# Patient Record
Sex: MALE | Race: WHITE | NOT HISPANIC OR LATINO | Employment: FULL TIME | URBAN - NONMETROPOLITAN AREA
[De-identification: names, ages, dates, MRNs, and addresses within clinical notes are randomized per-mention and may not be internally consistent; named-entity substitution may affect disease eponyms.]

---

## 2019-05-31 ENCOUNTER — HOSPITAL ENCOUNTER (EMERGENCY)
Facility: HOSPITAL | Age: 29
End: 2019-05-31

## 2020-09-21 ENCOUNTER — HOSPITAL ENCOUNTER (EMERGENCY)
Facility: HOSPITAL | Age: 30
Discharge: HOME | End: 2020-09-21
Attending: EMERGENCY MEDICINE | Admitting: EMERGENCY MEDICINE

## 2020-09-21 ENCOUNTER — TELEPHONE (OUTPATIENT)
Dept: EMERGENCY DEPT | Facility: HOSPITAL | Age: 30
End: 2020-09-21

## 2020-09-21 VITALS
HEIGHT: 71 IN | TEMPERATURE: 99.1 F | BODY MASS INDEX: 32.2 KG/M2 | SYSTOLIC BLOOD PRESSURE: 137 MMHG | OXYGEN SATURATION: 98 % | RESPIRATION RATE: 18 BRPM | DIASTOLIC BLOOD PRESSURE: 93 MMHG | HEART RATE: 109 BPM | WEIGHT: 230 LBS

## 2020-09-21 DIAGNOSIS — R11.2 NON-INTRACTABLE VOMITING WITH NAUSEA, UNSPECIFIED VOMITING TYPE: Primary | ICD-10-CM

## 2020-09-21 DIAGNOSIS — R50.9 FEVER, UNSPECIFIED FEVER CAUSE: ICD-10-CM

## 2020-09-21 DIAGNOSIS — R51.9 ACUTE NONINTRACTABLE HEADACHE, UNSPECIFIED HEADACHE TYPE: ICD-10-CM

## 2020-09-21 LAB
ALBUMIN SERPL-MCNC: 4.3 G/DL (ref 3.5–5.2)
ALBUMIN/GLOB SERPL: 1.3 G/DL
ALP SERPL-CCNC: 81 U/L (ref 39–117)
ALT SERPL W P-5'-P-CCNC: 31 U/L (ref 1–41)
ANION GAP SERPL CALCULATED.3IONS-SCNC: 10 MMOL/L (ref 5–15)
AST SERPL-CCNC: 26 U/L (ref 1–40)
BASOPHILS # BLD AUTO: 0.01 10*3/MM3 (ref 0–0.2)
BASOPHILS NFR BLD AUTO: 0.3 % (ref 0–1.5)
BILIRUB SERPL-MCNC: 0.5 MG/DL (ref 0–1.2)
BUN SERPL-MCNC: 11 MG/DL (ref 6–20)
BUN/CREAT SERPL: 9.7 (ref 7–25)
CALCIUM SPEC-SCNC: 8.7 MG/DL (ref 8.6–10.5)
CHLORIDE SERPL-SCNC: 99 MMOL/L (ref 98–107)
CO2 SERPL-SCNC: 26 MMOL/L (ref 22–29)
CREAT SERPL-MCNC: 1.13 MG/DL (ref 0.76–1.27)
DEPRECATED RDW RBC AUTO: 36.3 FL (ref 37–54)
EOSINOPHIL # BLD AUTO: 0 10*3/MM3 (ref 0–0.4)
EOSINOPHIL NFR BLD AUTO: 0 % (ref 0.3–6.2)
ERYTHROCYTE [DISTWIDTH] IN BLOOD BY AUTOMATED COUNT: 12 % (ref 12.3–15.4)
GFR SERPL CREATININE-BSD FRML MDRD: 75 ML/MIN/1.73
GLOBULIN UR ELPH-MCNC: 3.2 GM/DL
GLUCOSE SERPL-MCNC: 123 MG/DL (ref 65–99)
HCT VFR BLD AUTO: 45.1 % (ref 37.5–51)
HGB BLD-MCNC: 14.9 G/DL (ref 13–17.7)
IMM GRANULOCYTES # BLD AUTO: 0.02 10*3/MM3 (ref 0–0.05)
IMM GRANULOCYTES NFR BLD AUTO: 0.5 % (ref 0–0.5)
LIPASE SERPL-CCNC: 17 U/L (ref 13–60)
LYMPHOCYTES # BLD AUTO: 0.48 10*3/MM3 (ref 0.7–3.1)
LYMPHOCYTES NFR BLD AUTO: 12.5 % (ref 19.6–45.3)
MCH RBC QN AUTO: 28 PG (ref 26.6–33)
MCHC RBC AUTO-ENTMCNC: 33 G/DL (ref 31.5–35.7)
MCV RBC AUTO: 84.8 FL (ref 79–97)
MONOCYTES # BLD AUTO: 0.24 10*3/MM3 (ref 0.1–0.9)
MONOCYTES NFR BLD AUTO: 6.3 % (ref 5–12)
NEUTROPHILS NFR BLD AUTO: 3.09 10*3/MM3 (ref 1.7–7)
NEUTROPHILS NFR BLD AUTO: 80.4 % (ref 42.7–76)
NRBC BLD AUTO-RTO: 0 /100 WBC (ref 0–0.2)
PLATELET # BLD AUTO: 172 10*3/MM3 (ref 140–450)
PMV BLD AUTO: 8.2 FL (ref 6–12)
POTASSIUM SERPL-SCNC: 4.2 MMOL/L (ref 3.5–5.2)
PROT SERPL-MCNC: 7.5 G/DL (ref 6–8.5)
RBC # BLD AUTO: 5.32 10*6/MM3 (ref 4.14–5.8)
SARS-COV-2 RNA NOSE QL NAA+PROBE: NOT DETECTED
SODIUM SERPL-SCNC: 135 MMOL/L (ref 136–145)
WBC # BLD AUTO: 3.84 10*3/MM3 (ref 3.4–10.8)

## 2020-09-21 PROCEDURE — 85025 COMPLETE CBC W/AUTO DIFF WBC: CPT | Performed by: EMERGENCY MEDICINE

## 2020-09-21 PROCEDURE — 96375 TX/PRO/DX INJ NEW DRUG ADDON: CPT

## 2020-09-21 PROCEDURE — 80053 COMPREHEN METABOLIC PANEL: CPT | Performed by: EMERGENCY MEDICINE

## 2020-09-21 PROCEDURE — U0004 COV-19 TEST NON-CDC HGH THRU: HCPCS | Performed by: EMERGENCY MEDICINE

## 2020-09-21 PROCEDURE — 25010000002 METOCLOPRAMIDE PER 10 MG: Performed by: EMERGENCY MEDICINE

## 2020-09-21 PROCEDURE — 96374 THER/PROPH/DIAG INJ IV PUSH: CPT

## 2020-09-21 PROCEDURE — 99283 EMERGENCY DEPT VISIT LOW MDM: CPT

## 2020-09-21 PROCEDURE — 83690 ASSAY OF LIPASE: CPT | Performed by: EMERGENCY MEDICINE

## 2020-09-21 PROCEDURE — 25010000002 KETOROLAC TROMETHAMINE PER 15 MG: Performed by: EMERGENCY MEDICINE

## 2020-09-21 PROCEDURE — 25010000002 ONDANSETRON PER 1 MG: Performed by: EMERGENCY MEDICINE

## 2020-09-21 PROCEDURE — 25010000002 DIPHENHYDRAMINE PER 50 MG: Performed by: EMERGENCY MEDICINE

## 2020-09-21 RX ORDER — DIPHENHYDRAMINE HYDROCHLORIDE 50 MG/ML
50 INJECTION INTRAMUSCULAR; INTRAVENOUS ONCE
Status: COMPLETED | OUTPATIENT
Start: 2020-09-21 | End: 2020-09-21

## 2020-09-21 RX ORDER — SODIUM CHLORIDE 0.9 % (FLUSH) 0.9 %
10 SYRINGE (ML) INJECTION AS NEEDED
Status: DISCONTINUED | OUTPATIENT
Start: 2020-09-21 | End: 2020-09-21 | Stop reason: HOSPADM

## 2020-09-21 RX ORDER — NAPROXEN 500 MG/1
500 TABLET ORAL 2 TIMES DAILY WITH MEALS
Qty: 20 TABLET | Refills: 0 | OUTPATIENT
Start: 2020-09-21 | End: 2022-04-14

## 2020-09-21 RX ORDER — ONDANSETRON 4 MG/1
4 TABLET, ORALLY DISINTEGRATING ORAL 4 TIMES DAILY PRN
Qty: 15 TABLET | Refills: 0 | OUTPATIENT
Start: 2020-09-21 | End: 2022-04-14

## 2020-09-21 RX ORDER — ONDANSETRON 2 MG/ML
4 INJECTION INTRAMUSCULAR; INTRAVENOUS
Status: DISCONTINUED | OUTPATIENT
Start: 2020-09-21 | End: 2020-09-21 | Stop reason: HOSPADM

## 2020-09-21 RX ORDER — METOCLOPRAMIDE HYDROCHLORIDE 5 MG/ML
10 INJECTION INTRAMUSCULAR; INTRAVENOUS ONCE
Status: COMPLETED | OUTPATIENT
Start: 2020-09-21 | End: 2020-09-21

## 2020-09-21 RX ORDER — KETOROLAC TROMETHAMINE 30 MG/ML
30 INJECTION, SOLUTION INTRAMUSCULAR; INTRAVENOUS ONCE
Status: COMPLETED | OUTPATIENT
Start: 2020-09-21 | End: 2020-09-21

## 2020-09-21 RX ADMIN — DIPHENHYDRAMINE HYDROCHLORIDE 50 MG: 50 INJECTION INTRAMUSCULAR; INTRAVENOUS at 04:51

## 2020-09-21 RX ADMIN — ONDANSETRON 4 MG: 2 INJECTION INTRAMUSCULAR; INTRAVENOUS at 04:48

## 2020-09-21 RX ADMIN — KETOROLAC TROMETHAMINE 30 MG: 30 INJECTION, SOLUTION INTRAMUSCULAR at 04:52

## 2020-09-21 RX ADMIN — METOCLOPRAMIDE 10 MG: 5 INJECTION, SOLUTION INTRAMUSCULAR; INTRAVENOUS at 04:54

## 2020-09-21 RX ADMIN — SODIUM CHLORIDE 1000 ML: 9 INJECTION, SOLUTION INTRAVENOUS at 04:45

## 2020-09-21 NOTE — ED PROVIDER NOTES
EMERGENCY DEPARTMENT ENCOUNTER      Pt Name: Clifton Briggs  MRN: [id number]  YOB: 1990  Date of evaluation: 9/21/2020  Provider: Carlos Mccarthy DO    CHIEF COMPLAINT       Chief Complaint   Patient presents with   • Vomiting         HISTORY OF PRESENT ILLNESS  (Location/Symptom, Timing/Onset, Context/Setting, Quality, Duration, Modifying Factors, Severity.)   Clifton Briggs is a 30 y.o. male who presents to the emergency department for evaluation of nausea vomiting fever, onset earlier this morning upon awakening.  He notes low-grade fever thought the day, then nausea vomiting, nonbloody, nonbilious in nature throughout the evening.  The patient denies any recent known sick exposures or recent travel.  No prior coronavirus testing or known sick contacts.  He does work at [employer].  The patient also notes a generalized headache, migrainous in nature with a history of recurrent migraines.  Denies any diarrhea, has had some very mild constipation.  Denies any chest pain difficulty breathing, no abdominal pain.  He does note photophobia and phonophobia with a history of recurrent headaches and migraines.  No neck pain or stiffness.  He denies any other acute systemic complaints at this time.      Nursing notes were reviewed.    REVIEW OF SYSTEMS    (2-9 systems for level 4, 10 or more for level 5)   ROS:  General:  + fevers, no chills, no weakness  Cardiovascular:  No chest pain, no palpitations  Respiratory:  No shortness of breath, no cough, no wheezing  Gastrointestinal:  No pain, positive nausea, vomiting, no diarrhea  Musculoskeletal:  No muscle pain, no joint pain  Skin:  No rash, no easy bruising  Neurologic:  No speech problems, + headache, no extremity numbness, no extremity tingling, no extremity weakness  Psychiatric:  No anxiety  Genitourinary:  No dysuria, no hematuria    Except as noted above the remainder of the review of systems was reviewed and negative.       PAST  "MEDICAL HISTORY     Past Medical History:   Diagnosis Date   • Migraine    • Nosebleed          SURGICAL HISTORY       Past Surgical History:   Procedure Laterality Date   • HEMORRHOIDECTOMY           CURRENT MEDICATIONS       Current Facility-Administered Medications:   •  ondansetron (ZOFRAN) injection 4 mg, 4 mg, Intravenous, Q30 Min PRN, Carlos Mccarthy DO, 4 mg at 09/21/20 0448  •  [COMPLETED] Insert peripheral IV, , , Once **AND** sodium chloride 0.9 % flush 10 mL, 10 mL, Intravenous, PRN, Carlos Mccarthy DO    Current Outpatient Medications:   •  amitriptyline (ELAVIL) 50 MG tablet, Take 50 mg by mouth every night., Disp: , Rfl:   •  Inulin (FIBER CHOICE PO), Take 1 tablet by mouth as needed., Disp: , Rfl:   •  naproxen (Naprosyn) 500 MG tablet, Take 1 tablet by mouth 2 (Two) Times a Day With Meals., Disp: 20 tablet, Rfl: 0  •  ondansetron ODT (ZOFRAN-ODT) 4 MG disintegrating tablet, Place 1 tablet on the tongue 4 (Four) Times a Day As Needed for Nausea or Vomiting., Disp: 15 tablet, Rfl: 0    ALLERGIES     Patient has no known allergies.    FAMILY HISTORY     No family history on file.       SOCIAL HISTORY       Social History     Socioeconomic History   • Marital status:      Spouse name: Not on file   • Number of children: Not on file   • Years of education: Not on file   • Highest education level: Not on file   Tobacco Use   • Smoking status: Former Smoker   Substance and Sexual Activity   • Alcohol use: Yes     Alcohol/week: 2.0 standard drinks     Types: 2 Cans of beer per week   • Drug use: No   • Sexual activity: Defer         PHYSICAL EXAM    (up to 7 for level 4, 8 or more for level 5)     Vitals:    09/21/20 0402   BP: 137/93   BP Location: Right arm   Patient Position: Sitting   Pulse: 109   Resp: 18   Temp: 99.1 °F (37.3 °C)   TempSrc: Oral   SpO2: 98%   Weight: 104 kg (230 lb)   Height: 180.3 cm (71\")       Physical Exam  General : Patient is awake, alert, oriented, in no " acute distress, nontoxic appearing  HEENT: Pupils are equally round and reactive to light, EOMI, conjunctivae clear, sclerae white, there is no injection no icterus.  Oral mucosa is moist, no exudate. Uvula is midline  Neck: Neck is supple, full range of motion, trachea midline, no meningeal signs  Cardiac: Heart tachycardic rate with regular rhythm, no murmurs, rubs, or gallops  Lungs: Lungs are clear to auscultation, there is no wheezing, rhonchi, or rales. There is no use of accessory muscles  Abdomen: Abdomen is soft, nontender, nondistended. There are no firm or pulsatile masses, no rebound rigidity or guarding.   Musculoskeletal: 5 out of 5 strength in all 4 extremities.  No focal muscle deficits are appreciated  Neuro: Motor intact, sensory intact, level of consciousness is normal, GCS 15  Dermatology: Skin is warm and dry  Psych: Mentation is grossly normal, cognition is grossly normal. Affect is appropriate.      DIAGNOSTIC RESULTS     EKG: All EKG's are interpreted by the Emergency Department Physician who either signs or Co-signs this chart in the absence of a cardiologist.    No orders to display       RADIOLOGY:   Non-plain film images such as CT, Ultrasound and MRI are read by the radiologist. Plain radiographic images are visualized and preliminarily interpreted by the emergency physician with the below findings:      [] Radiologist's Report Reviewed:  No orders to display         ED BEDSIDE ULTRASOUND:   Performed by ED Physician - none    LABS:    I have reviewed and interpreted all of the currently available lab results from this visit (if applicable):  Results for orders placed or performed during the hospital encounter of 09/21/20   Comprehensive Metabolic Panel    Specimen: Blood   Result Value Ref Range    Glucose 123 (H) 65 - 99 mg/dL    BUN 11 6 - 20 mg/dL    Creatinine 1.13 0.76 - 1.27 mg/dL    Sodium 135 (L) 136 - 145 mmol/L    Potassium 4.2 3.5 - 5.2 mmol/L    Chloride 99 98 - 107 mmol/L  "   CO2 26.0 22.0 - 29.0 mmol/L    Calcium 8.7 8.6 - 10.5 mg/dL    Total Protein 7.5 6.0 - 8.5 g/dL    Albumin 4.30 3.50 - 5.20 g/dL    ALT (SGPT) 31 1 - 41 U/L    AST (SGOT) 26 1 - 40 U/L    Alkaline Phosphatase 81 39 - 117 U/L    Total Bilirubin 0.5 0.0 - 1.2 mg/dL    eGFR Non African Amer 75 >60 mL/min/1.73    Globulin 3.2 gm/dL    A/G Ratio 1.3 g/dL    BUN/Creatinine Ratio 9.7 7.0 - 25.0    Anion Gap 10.0 5.0 - 15.0 mmol/L   Lipase    Specimen: Blood   Result Value Ref Range    Lipase 17 13 - 60 U/L   CBC Auto Differential    Specimen: Blood   Result Value Ref Range    WBC 3.84 3.40 - 10.80 10*3/mm3    RBC 5.32 4.14 - 5.80 10*6/mm3    Hemoglobin 14.9 13.0 - 17.7 g/dL    Hematocrit 45.1 37.5 - 51.0 %    MCV 84.8 79.0 - 97.0 fL    MCH 28.0 26.6 - 33.0 pg    MCHC 33.0 31.5 - 35.7 g/dL    RDW 12.0 (L) 12.3 - 15.4 %    RDW-SD 36.3 (L) 37.0 - 54.0 fl    MPV 8.2 6.0 - 12.0 fL    Platelets 172 140 - 450 10*3/mm3    Neutrophil % 80.4 (H) 42.7 - 76.0 %    Lymphocyte % 12.5 (L) 19.6 - 45.3 %    Monocyte % 6.3 5.0 - 12.0 %    Eosinophil % 0.0 (L) 0.3 - 6.2 %    Basophil % 0.3 0.0 - 1.5 %    Immature Grans % 0.5 0.0 - 0.5 %    Neutrophils, Absolute 3.09 1.70 - 7.00 10*3/mm3    Lymphocytes, Absolute 0.48 (L) 0.70 - 3.10 10*3/mm3    Monocytes, Absolute 0.24 0.10 - 0.90 10*3/mm3    Eosinophils, Absolute 0.00 0.00 - 0.40 10*3/mm3    Basophils, Absolute 0.01 0.00 - 0.20 10*3/mm3    Immature Grans, Absolute 0.02 0.00 - 0.05 10*3/mm3    nRBC 0.0 0.0 - 0.2 /100 WBC        All other labs were within normal range or not returned as of this dictation.      EMERGENCY DEPARTMENT COURSE and DIFFERENTIAL DIAGNOSIS/MDM:   Vitals:    Vitals:    09/21/20 0402   BP: 137/93   BP Location: Right arm   Patient Position: Sitting   Pulse: 109   Resp: 18   Temp: 99.1 °F (37.3 °C)   TempSrc: Oral   SpO2: 98%   Weight: 104 kg (230 lb)   Height: 180.3 cm (71\")            Patient with nausea vomiting, fever throughout the day today.  Nontoxic-appearing, " no meningeal signs on examination.  Slightly tachycardic upon arrival, vital signs otherwise stable.  Temperature 99.1.  He also has an underlying headache, migrainous in nature with a history of migraines.  With these constellation of symptoms we did obtain IV, labs, patient was given symptomatic therapies with IV fluids, nausea medication and migraine cocktail.  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  Blood work labs reviewed as above, no acute abnormalities, after our intervention patient resting comfortably, states his symptoms have improved greatly.  Will obtain a COVID-19 swab testing will take a few days for the results, patient will quarantine at home until he has his results, continue with symptomatic treatments, rest, maintain good fluid hydration, close follow-up with his PCP in a couple days for reevaluation.  If he has any worsening symptoms, shortness of breath or any other concerns he will return back to the emergency department for reevaluation. I had a discussion with the patient/family regarding diagnosis, diagnostic results, treatment plan, and medications.  The patient/family indicated understanding of these instructions.  I spent adequate time at the bedside proceeding discharge necessary to personally discuss the aftercare instructions, giving patient education, providing explanations of the results of our evaluations/findings, and my decision making to assure that the patient/family understand the plan of care.  Time was allotted to answer questions at that time and throughout the ED course.  Emphasis was placed on timely follow-up after discharge.  I also discussed the potential for the development of an acute emergent condition requiring further evaluation, admission, or even surgical intervention. I discussed that we found nothing during the visit today indicating the need for further workup, admission, or  the presence of an unstable medical condition.  I encouraged the patient to return to the emergency department immediately for ANY concerns, worsening, new complaints, or if symptoms persist and unable to seek follow-up in a timely fashion.  The patient/family expressed understanding and agreement with this plan.  The patient will follow-up with their PCP in 1-2 days for reevaluation.       MEDICATIONS ADMINISTERED IN ED:  Medications   sodium chloride 0.9 % flush 10 mL (has no administration in time range)   ondansetron (ZOFRAN) injection 4 mg (4 mg Intravenous Given 9/21/20 0448)   sodium chloride 0.9 % bolus 1,000 mL (1,000 mL Intravenous New Bag 9/21/20 0445)   diphenhydrAMINE (BENADRYL) injection 50 mg (50 mg Intravenous Given 9/21/20 0451)   metoclopramide (REGLAN) injection 10 mg (10 mg Intravenous Given 9/21/20 0454)   ketorolac (TORADOL) injection 30 mg (30 mg Intravenous Given 9/21/20 0452)       PROCEDURES:  Procedures    CRITICAL CARE TIME    Total Critical Care time was 0 minutes, excluding separately reportable procedures.   There was a high probability of clinically significant/life threatening deterioration in the patient's condition which required my urgent intervention.      FINAL IMPRESSION      1. Non-intractable vomiting with nausea, unspecified vomiting type    2. Fever, unspecified fever cause    3. Acute nonintractable headache, unspecified headache type          DISPOSITION/PLAN     ED Disposition     ED Disposition Condition Comment    Discharge Stable           PATIENT REFERRED TO:  Acosta Lehman MD  [address] [place], [place] [id number] [phone]    In 2 days      [facility] Emergency Department  [address] [place] [address] [phone]    If symptoms worsen      DISCHARGE MEDICATIONS:     Medication List      START taking these medications    naproxen 500 MG tablet  Commonly known as: Naprosyn  Take 1 tablet by mouth 2 (Two) Times a Day  With Meals.     ondansetron ODT 4 MG disintegrating tablet  Commonly known as: ZOFRAN-ODT  Place 1 tablet on the tongue 4 (Four) Times a Day As Needed for Nausea or Vomiting.        CONTINUE taking these medications    amitriptyline 50 MG tablet  Commonly known as: ELAVIL     FIBER CHOICE PO           Where to Get Your Medications      You can get these medications from any pharmacy    Bring a paper prescription for each of these medications  · naproxen 500 MG tablet  · ondansetron ODT 4 MG disintegrating tablet             Comment: Please note this report has been produced using speech recognition software.      Carlos Mccarthy DO  Attending Emergency Physician               Carlos Mccarthy,   09/21/20 4539

## 2020-09-21 NOTE — DISCHARGE INSTRUCTIONS
How to Quarantine at Home  Information for Patients and Families    These instructions are for people with confirmed or suspected COVID-19 who do not need to be hospitalized and those with confirmed FOR THE MOST UP-TO DATE INFORMATION, VISIT:  https://www.cdc.gov/coronavirus/2019-nCoV/index.html    IF TESTED IN THE EMERGENCY DEPARTMENT:  1.  You should receive a call within 2-4 days telling you of your results, positive or negative.  2.  If you have not heard back within 48 hours of discharge you may call [phone] and we  will return your call as soon as possible (likely the next business day).  3.  Your results can be accessed in [email or web address].  4.   Please self-isolate until results are returned      IF YOU ARE UNSURE IF YOU HAVE COVID-19  - Quarantine is used to keep someone who might have been exposed to COVID-19 away from  others. Quarantine helps prevent spread of disease that can occur before a person knows they are  sick or if they are infected with the virus without feeling symptoms. People in quarantine should  stay home, separate themselves from others, monitor their health, and follow directions from  their state or local health department.    Who needs to quarantine?  - For all of the following scenarios, even if you test negative for COVID-19 or feel healthy,  you should STAY HOME (quarantine) since symptoms may appear 2-14 days after  exposure to the virus.  - Anyone who has been in close contact with someone who has COVID-19.  This includes people who previously had COVID-19 and people who have taken a serologic  (antibody) test and have antibodies to the virus.    What counts as close contact?  ? You were within 6 feet of someone who has COVID-19 for at least 15 minutes  ? You provided care at home to someone who is sick with COVID-19  ? You had direct physical contact with the person (touched, hugged, or kissed them)  ? You shared eating or drinking utensils  ? They sneezed, coughed, or somehow got  respiratory droplets on you    STEPS TO TAKE  - Stay home and monitor your health  ? Stay home for 14 days after your last contact with a person who has COVID-19  ? Watch for fever (100.4?F), cough, shortness of breath, or other symptoms of COVID-19  - If possible, stay away others, especially people who are at higher risk for getting very sick from COVID-19    When to start and end quarantine  You should stay home for 14 days after your last contact with a person who has COVID-19.    IF YOU TESTED POSITIVE FOR COVID-19:  Isolation is used to separate people infected with the virus (those who are sick with COVID-19  and those with no symptoms) from people who are not infected.  People who are in isolation should stay home until it is safe for them to be around others. In the  home, anyone sick or infected should separate themselves from others by staying in a specific  “sick room” or area and using a separate bathroom (if available).    Who needs to isolate?  People who have COVID-19  ? People who have symptoms of COVID-19 and are able to recover at home  ? People who have no symptoms (are asymptomatic) but have tested positive for COVID-19    STEPS TO TAKE  Stay home except to get medical care  ? Monitor your symptoms. If you develop any symptoms that are worsening, severe, or give you  concern, seek emergency medical care immediately  ? Stay in a separate room from other household members, if possible  ? Use a separate bathroom, if possible  ? Avoid contact with other members of the household and pets  ? Don’t share personal household items, like cups, towels, and utensils  ? Wear a cloth face covering when around other people, if able    WHEN YOU CAN BE AROUND OTHERS AFTER YOU HAD OR LIKELY HAD  COVID-19  When you can be around others (end home isolation) depends on different factors for different  situations.  Find CDC’s recommendations for your situation below.  I think or know I had COVID-19, and I had  symptoms  You can be with others after  ? At least 10 days since symptoms first appeared and  ? At least 24 hours with no fever without fever-reducing medication and  ? Symptoms have improved  Depending on your healthcare provider’s advice and availability of testing, you might get tested  to see if you still have COVID-19. If you will be tested, you can be around others when you have  no fever, respiratory symptoms have improved, and you receive two negative test results in a  row, at least 24 hours apart.  I tested positive for COVID-19 but had no symptoms  If you continue to have no symptoms, you can be with others after:  ? 10 days have passed since test    Depending on your healthcare provider’s advice and availability of testing, you might get tested  to see if you still have COVID-19. If you will be tested, you can be around others after you  receive two negative test results in a row, at least 24 hours apart.    If you develop symptoms after testing positive, follow the guidance above for “I think or know I  had COVID, and I had symptoms.”    I have a weakened immune system  - If you have a weakened immune system (immunocompromised) due to a health condition or  medication, when can you be around others?  - People with conditions that weaken their immune system might need to stay home longer than 10  days. Talk to your healthcare provider for more information.  If testing is available in your  community, it may be recommended by your healthcare provider.  You can be with others after  you receive two negative test results in a row, at least 24 hours apart.  If testing is not available in your area, your doctor should work with an infectious disease expert  at your local health department to determine if you are likely to spread COVID-19 to others and  need to stay home longer.

## 2022-04-13 ENCOUNTER — HOSPITAL ENCOUNTER (EMERGENCY)
Facility: HOSPITAL | Age: 32
Discharge: HOME | End: 2022-04-14
Attending: STUDENT IN AN ORGANIZED HEALTH CARE EDUCATION/TRAINING PROGRAM | Admitting: STUDENT IN AN ORGANIZED HEALTH CARE EDUCATION/TRAINING PROGRAM

## 2022-04-13 ENCOUNTER — APPOINTMENT (OUTPATIENT)
Dept: GENERAL RADIOLOGY | Facility: HOSPITAL | Age: 32
End: 2022-04-13

## 2022-04-13 DIAGNOSIS — Z78.9 PROLONGED STANDING: ICD-10-CM

## 2022-04-13 DIAGNOSIS — M79.605 LEFT LEG PAIN: ICD-10-CM

## 2022-04-13 DIAGNOSIS — R03.0 ELEVATED BLOOD-PRESSURE READING WITHOUT DIAGNOSIS OF HYPERTENSION: ICD-10-CM

## 2022-04-13 DIAGNOSIS — M25.562 ACUTE PAIN OF LEFT KNEE: Primary | ICD-10-CM

## 2022-04-13 PROCEDURE — 73560 X-RAY EXAM OF KNEE 1 OR 2: CPT

## 2022-04-13 PROCEDURE — 99282 EMERGENCY DEPT VISIT SF MDM: CPT

## 2022-04-13 PROCEDURE — 73590 X-RAY EXAM OF LOWER LEG: CPT

## 2022-04-14 VITALS
HEIGHT: 71 IN | SYSTOLIC BLOOD PRESSURE: 130 MMHG | DIASTOLIC BLOOD PRESSURE: 90 MMHG | TEMPERATURE: 97.8 F | WEIGHT: 235 LBS | OXYGEN SATURATION: 96 % | RESPIRATION RATE: 16 BRPM | BODY MASS INDEX: 32.9 KG/M2 | HEART RATE: 85 BPM

## 2022-04-14 NOTE — ED PROVIDER NOTES
"Subjective   This is a 31-year-old male that presents the ER with 4-week history of left knee and left lower extremity pain along the shin.  Patient denies any recent known injury or trauma.  Patient works at [employer] on day shift.  He is on his feet for several hours per day with frequent ambulating and repetitive movements.  Patient said he bought some new insoles for his work boots approximately 2 months ago and shortly after started having some left knee pain.  He feels like there is a \"hitch\" in his knee.  He denies any laxity or sensation that the left knee is going to give out.  He also reports some pain in the anterior left lower extremity, especially with weightbearing and frequent standing.  Patient has been utilizing a left knee over-the-counter sleeve that has been helping with pain, and he also takes intermittent ibuprofen and Tylenol.  Patient reports some mild tingling to the bottom of his foot and left toes.  There is no swelling, redness, or warmth to the left leg.  He denies any previous left knee fracture or twisting injury.  Past medical history is significant for migraines.  No other concerns at this time.  Patient denies any low back pain.  He denies any urinary or bowel changes.      History provided by:  Patient  Leg Pain  Location:  Knee and leg  Time since incident:  4 weeks  Injury: no    Leg location:  L lower leg  Knee location:  L knee  Pain details:     Onset quality:  Gradual    Duration:  4 weeks    Timing:  Constant    Progression:  Unchanged  Chronicity:  New  Prior injury to area:  No (No previous left knee fracture.  Pt has had over-extension to left knee.)  Relieved by: Left knee sleeve.  Worsened by:  Bearing weight, activity and abduction (ambulation)  Ineffective treatments:  NSAIDs and acetaminophen  Associated symptoms: decreased ROM and tingling (to bottom of left foot.)    Associated symptoms: no back pain and no swelling        Review of Systems   Musculoskeletal: Positive " "for arthralgias (Pain to left anterior lower leg and left knee.  Pt got new insoles 2 months ago.  Works at [employer].  Left knee feels like it has a \"hitch\" in it.) and gait problem (Painful weight bearing to left lower extremity.). Negative for back pain and joint swelling.   Skin: Negative.  Negative for color change and wound.   Neurological: Negative for numbness.        Tingling to bottom of left foot and toes on left foot.     All other systems reviewed and are negative.      Past Medical History:   Diagnosis Date   • Migraine    • Nosebleed        No Known Allergies    Past Surgical History:   Procedure Laterality Date   • HEMORRHOIDECTOMY         History reviewed. No pertinent family history.    Social History     Socioeconomic History   • Marital status:    Tobacco Use   • Smoking status: Former Smoker   Substance and Sexual Activity   • Alcohol use: Yes     Alcohol/week: 2.0 standard drinks     Types: 2 Shots of liquor per week     Comment: NIGHTLY   • Drug use: No   • Sexual activity: Defer           Objective   Physical Exam  Vitals and nursing note reviewed.   Constitutional:       Appearance: Normal appearance.   HENT:      Head: Normocephalic and atraumatic.      Right Ear: Tympanic membrane normal.      Left Ear: Tympanic membrane normal.      Nose: Nose normal.      Mouth/Throat:      Mouth: Mucous membranes are moist.      Pharynx: Oropharynx is clear.   Eyes:      Extraocular Movements: Extraocular movements intact.      Conjunctiva/sclera: Conjunctivae normal.      Pupils: Pupils are equal, round, and reactive to light.   Cardiovascular:      Rate and Rhythm: Normal rate and regular rhythm.  No extrasystoles are present.     Pulses: Normal pulses.           Dorsalis pedis pulses are 2+ on the right side and 2+ on the left side.        Posterior tibial pulses are 2+ on the right side and 2+ on the left side.      Heart sounds: Normal heart sounds.      Comments: Regular rate and rhythm.  No " ectopy.  No tachycardia.  No pedal edema to lower extremities.  Strong bilateral DP and PT pulses and brisk capillary refill.  Pulmonary:      Effort: Pulmonary effort is normal.      Breath sounds: Normal breath sounds.   Abdominal:      General: Bowel sounds are normal.      Palpations: Abdomen is soft.   Musculoskeletal:         General: Normal range of motion.      Cervical back: Normal range of motion and neck supple.      Right lower leg: No edema.      Left lower leg: No edema.      Comments: No particular left knee tenderness to palpation.  There is no laxity.  Pain noted with lateral stress.  No swelling or joint effusion.  Full range of motion.  There is no particular tenderness to the left anterior lower extremity.  No soft tissue swelling, warmth, or evidence of varicose veins.  No bony tenderness to the left ankle or foot.  No change in skin or discoloration.  No pallor or rubor noted.   Skin:     General: Skin is warm and dry.      Findings: No abrasion, bruising or ecchymosis.      Comments: No soft tissue swelling to left lower extremity and no erythema or warmth.  No bruising or sign of injury.   Neurological:      General: No focal deficit present.      Mental Status: He is alert.         Procedures           ED Course  ED Course as of 04/14/22 0058   Thu Apr 14, 2022   0052 X-rays of the left knee and left tibia/fibula reveal no acute bony abnormality.  Skin exam is within normal limits and there is no soft tissue swelling.  Strong distal pulses to the left lower extremity.  Question whether patient is having increased pain related to footwear with frequent standing.  He wears work boots and bought some over-the-counter insoles.  Strongly recommend follow-up with orthopedist, Dr. Dia, for recheck, and we also will give follow-up information for podiatry.  Patient may need specially made inserts with frequent standing for several hours, especially with wearing work boots.  We will prescribe  "diclofenac 50 mg by mouth 3 times daily with meals as needed for pain/inflammation.  Patient may also continue to utilize left knee sleeve to help with stability.  Vital signs and exam are stable.  Return to the ER if worsening symptoms. [FC]   0056 Patient's blood pressure was also elevated without personal history of hypertension.  Question whether this is pain related.  Recommend close PCP follow-up for recheck. [FC]      ED Course User Index  [FC] Leanna Arce, SHAILESH            No results found for this or any previous visit (from the past 24 hour(s)).  Note: In addition to lab results from this visit, the labs listed above may include labs taken at another facility or during a different encounter within the last 24 hours. Please correlate lab times with ED admission and discharge times for further clarification of the services performed during this visit.    XR Tibia Fibula 2 View Left   Final Result   Left tibia and fibula appear within normal limits.       This report was finalized on 4/13/2022 11:32 PM by Dr. Marcelino Hernandez MD.          XR Knee 1 or 2 View Left   Final Result   No evidence of acute left knee trauma or advanced degenerative change.       This report was finalized on 4/13/2022 11:31 PM by Dr. Marcelino Hernandez MD.            Vitals:    04/13/22 1959 04/14/22 0045   BP: (!) 160/105    BP Location: Right arm    Patient Position: Sitting    Pulse: 95 85   Resp: 16    Temp: 97.8 °F (36.6 °C)    TempSrc: Oral    SpO2: 98% 96%   Weight: 107 kg (235 lb)    Height: 180.3 cm (71\")      Medications - No data to display  ECG/EMG Results (last 24 hours)     ** No results found for the last 24 hours. **        No orders to display                                             MDM    Final diagnoses:   Acute pain of left knee   Left leg pain   Prolonged standing   Elevated blood-pressure reading without diagnosis of hypertension       ED Disposition  ED Disposition     ED Disposition   Discharge    Condition   Stable "    Comment   --             On license of [facility]  [address] [facility] [address] [phone]  Call in 1 day  Call the morning for first available recheck    Alfredito Dia MD  [address] [facility]  [address]  [place] [id number]  [phone]    Call in 1 day  Call in the morning for first available recheck    [facility] Emergency Department  [address] [facility] [address] [phone]    If symptoms worsen    Acosta Lehman MD  [address] [place] [address]   [place] [id number] [phone]    Schedule an appointment as soon as possible for a visit in 2 days  Close PCP follow-up for recheck on elevated blood pressure during the ER work-up         Medication List      New Prescriptions    diclofenac 50 MG EC tablet  Commonly known as: VOLTAREN  Take 1 tablet by mouth 3 (Three) Times a Day.        Stop    FIBER CHOICE PO     naproxen 500 MG tablet  Commonly known as: Naprosyn     ondansetron ODT 4 MG disintegrating tablet  Commonly known as: ZOFRAN-ODT           Where to Get Your Medications      These medications were sent to [facility]/pharmacy #[id number] - [place], [place] - [address]  - [phone]  - [phone] FX  [address] [address]    Hours: 24-hours Phone: [phone]   · diclofenac 50 MG EC tablet          eLanna Arce PA-C  04/14/22 0058

## 2022-04-14 NOTE — DISCHARGE INSTRUCTIONS
ER evaluation reveals normal x-rays of the left knee and left tibia/fibula.  Patient works where he stands for prolonged periods.  Recommend orthopedic evaluation for further examination of the left knee and lower leg.  Patient may benefit from MRI of the left knee to rule out any type of meniscal or ligamentous injury.  Continue with knee sleeve, which seems to help with comfort and stabilization.  Recommend follow-up with podiatry.  Patient will benefit from specially made insoles that he can insert into his work boots.  Hopefully this would help with pain, as well.  Rx for diclofenac 50 mg by mouth 3 times daily as needed for pain/inflammation.  Recommend close PCP follow-up for recheck due to elevated blood pressure without history of hypertension.  Return to the ER if worsening symptoms.

## 2022-09-13 ENCOUNTER — LAB (OUTPATIENT)
Dept: LAB | Facility: HOSPITAL | Age: 32
End: 2022-09-13

## 2022-09-13 ENCOUNTER — TRANSCRIBE ORDERS (OUTPATIENT)
Dept: LAB | Facility: HOSPITAL | Age: 32
End: 2022-09-13

## 2022-09-13 DIAGNOSIS — L02.211 ABSCESS OF ABDOMINAL WALL: Primary | ICD-10-CM

## 2022-09-13 PROCEDURE — 87116 MYCOBACTERIA CULTURE: CPT

## 2022-09-13 PROCEDURE — 87206 SMEAR FLUORESCENT/ACID STAI: CPT

## 2022-09-13 PROCEDURE — 87205 SMEAR GRAM STAIN: CPT

## 2022-09-13 PROCEDURE — 87147 CULTURE TYPE IMMUNOLOGIC: CPT

## 2022-09-13 PROCEDURE — 87102 FUNGUS ISOLATION CULTURE: CPT

## 2022-09-13 PROCEDURE — 87070 CULTURE OTHR SPECIMN AEROBIC: CPT

## 2022-09-13 PROCEDURE — 87176 TISSUE HOMOGENIZATION CULTR: CPT

## 2022-09-16 LAB
BACTERIA SPEC AEROBE CULT: ABNORMAL
GRAM STN SPEC: ABNORMAL

## 2022-10-25 LAB
FUNGUS WND CULT: NORMAL
MYCOBACTERIUM SPEC CULT: NORMAL
NIGHT BLUE STAIN TISS: NORMAL

## 2023-01-25 ENCOUNTER — LAB (OUTPATIENT)
Dept: LAB | Facility: HOSPITAL | Age: 33
End: 2023-01-25
Payer: COMMERCIAL

## 2023-01-25 ENCOUNTER — TRANSCRIBE ORDERS (OUTPATIENT)
Dept: LAB | Facility: HOSPITAL | Age: 33
End: 2023-01-25
Payer: COMMERCIAL

## 2023-01-25 DIAGNOSIS — L08.0 PYODERMA: ICD-10-CM

## 2023-01-25 DIAGNOSIS — B02.8 HERPES ZOSTER WITH COMPLICATION: ICD-10-CM

## 2023-01-25 DIAGNOSIS — B00.9 DISEASE DUE TO ALPHAHERPESVIRINAE: ICD-10-CM

## 2023-01-25 DIAGNOSIS — L01.00 IMPETIGO: ICD-10-CM

## 2023-01-25 DIAGNOSIS — B00.9 DISEASE DUE TO ALPHAHERPESVIRINAE: Primary | ICD-10-CM

## 2023-01-25 PROCEDURE — 87529 HSV DNA AMP PROBE: CPT

## 2023-01-25 PROCEDURE — 87205 SMEAR GRAM STAIN: CPT

## 2023-01-25 PROCEDURE — 87070 CULTURE OTHR SPECIMN AEROBIC: CPT

## 2023-01-25 PROCEDURE — 87798 DETECT AGENT NOS DNA AMP: CPT

## 2023-01-28 LAB
BACTERIA SPEC AEROBE CULT: NORMAL
GRAM STN SPEC: NORMAL

## 2023-02-01 LAB
HSV1 DNA SPEC QL NAA+PROBE: NORMAL
HSV2 DNA SPEC QL NAA+PROBE: NORMAL
VZV DNA SPEC QL NAA+PROBE: NORMAL

## 2025-01-30 ENCOUNTER — HOSPITAL ENCOUNTER (EMERGENCY)
Facility: HOSPITAL | Age: 35
Discharge: HOME | End: 2025-01-30
Attending: STUDENT IN AN ORGANIZED HEALTH CARE EDUCATION/TRAINING PROGRAM
Payer: COMMERCIAL

## 2025-01-30 ENCOUNTER — APPOINTMENT (OUTPATIENT)
Facility: HOSPITAL | Age: 35
End: 2025-01-30
Payer: COMMERCIAL

## 2025-01-30 VITALS
TEMPERATURE: 98.9 F | HEART RATE: 94 BPM | SYSTOLIC BLOOD PRESSURE: 120 MMHG | BODY MASS INDEX: 30.78 KG/M2 | HEIGHT: 70 IN | WEIGHT: 215 LBS | RESPIRATION RATE: 16 BRPM | OXYGEN SATURATION: 100 % | DIASTOLIC BLOOD PRESSURE: 88 MMHG

## 2025-01-30 DIAGNOSIS — R91.8 PULMONARY NODULES: ICD-10-CM

## 2025-01-30 DIAGNOSIS — R11.2 NAUSEA VOMITING AND DIARRHEA: Primary | ICD-10-CM

## 2025-01-30 DIAGNOSIS — K21.9 GASTROESOPHAGEAL REFLUX DISEASE, UNSPECIFIED WHETHER ESOPHAGITIS PRESENT: ICD-10-CM

## 2025-01-30 DIAGNOSIS — R19.7 NAUSEA VOMITING AND DIARRHEA: Primary | ICD-10-CM

## 2025-01-30 LAB
ALBUMIN SERPL-MCNC: 4.2 G/DL (ref 3.5–5.2)
ALBUMIN/GLOB SERPL: 1.3 G/DL
ALP SERPL-CCNC: 54 U/L (ref 39–117)
ALT SERPL W P-5'-P-CCNC: 17 U/L (ref 1–41)
ANION GAP SERPL CALCULATED.3IONS-SCNC: 15.4 MMOL/L (ref 5–15)
AST SERPL-CCNC: 21 U/L (ref 1–40)
BASOPHILS # BLD AUTO: 0.01 10*3/MM3 (ref 0–0.2)
BASOPHILS NFR BLD AUTO: 0.2 % (ref 0–1.5)
BILIRUB SERPL-MCNC: 0.6 MG/DL (ref 0–1.2)
BILIRUB UR QL STRIP: NEGATIVE
BUN SERPL-MCNC: 14 MG/DL (ref 6–20)
BUN/CREAT SERPL: 14.9 (ref 7–25)
CALCIUM SPEC-SCNC: 8.9 MG/DL (ref 8.6–10.5)
CHLORIDE SERPL-SCNC: 97 MMOL/L (ref 98–107)
CLARITY UR: CLEAR
CO2 SERPL-SCNC: 27.6 MMOL/L (ref 22–29)
COLOR UR: YELLOW
CREAT SERPL-MCNC: 0.94 MG/DL (ref 0.76–1.27)
DEPRECATED RDW RBC AUTO: 36.6 FL (ref 37–54)
EGFRCR SERPLBLD CKD-EPI 2021: 107.7 ML/MIN/1.73
EOSINOPHIL # BLD AUTO: 0.1 10*3/MM3 (ref 0–0.4)
EOSINOPHIL NFR BLD AUTO: 1.7 % (ref 0.3–6.2)
ERYTHROCYTE [DISTWIDTH] IN BLOOD BY AUTOMATED COUNT: 11.8 % (ref 12.3–15.4)
GLOBULIN UR ELPH-MCNC: 3.3 GM/DL
GLUCOSE SERPL-MCNC: 85 MG/DL (ref 65–99)
GLUCOSE UR STRIP-MCNC: NEGATIVE MG/DL
HCT VFR BLD AUTO: 44.9 % (ref 37.5–51)
HGB BLD-MCNC: 14.6 G/DL (ref 13–17.7)
HGB UR QL STRIP.AUTO: NEGATIVE
HOLD SPECIMEN: NORMAL
IMM GRANULOCYTES # BLD AUTO: 0.01 10*3/MM3 (ref 0–0.05)
IMM GRANULOCYTES NFR BLD AUTO: 0.2 % (ref 0–0.5)
KETONES UR QL STRIP: ABNORMAL
LEUKOCYTE ESTERASE UR QL STRIP.AUTO: NEGATIVE
LIPASE SERPL-CCNC: 29 U/L (ref 13–60)
LYMPHOCYTES # BLD AUTO: 0.65 10*3/MM3 (ref 0.7–3.1)
LYMPHOCYTES NFR BLD AUTO: 11.3 % (ref 19.6–45.3)
MAGNESIUM SERPL-MCNC: 1.7 MG/DL (ref 1.6–2.6)
MCH RBC QN AUTO: 27.3 PG (ref 26.6–33)
MCHC RBC AUTO-ENTMCNC: 32.5 G/DL (ref 31.5–35.7)
MCV RBC AUTO: 84.1 FL (ref 79–97)
MONOCYTES # BLD AUTO: 0.37 10*3/MM3 (ref 0.1–0.9)
MONOCYTES NFR BLD AUTO: 6.4 % (ref 5–12)
NEUTROPHILS NFR BLD AUTO: 4.62 10*3/MM3 (ref 1.7–7)
NEUTROPHILS NFR BLD AUTO: 80.2 % (ref 42.7–76)
NITRITE UR QL STRIP: NEGATIVE
PH UR STRIP.AUTO: 6 [PH] (ref 5–8)
PLATELET # BLD AUTO: 278 10*3/MM3 (ref 140–450)
PMV BLD AUTO: 8 FL (ref 6–12)
POTASSIUM SERPL-SCNC: 3.9 MMOL/L (ref 3.5–5.2)
PROT SERPL-MCNC: 7.5 G/DL (ref 6–8.5)
PROT UR QL STRIP: NEGATIVE
RBC # BLD AUTO: 5.34 10*6/MM3 (ref 4.14–5.8)
SODIUM SERPL-SCNC: 140 MMOL/L (ref 136–145)
SP GR UR STRIP: 1.02 (ref 1–1.03)
UROBILINOGEN UR QL STRIP: ABNORMAL
WBC NRBC COR # BLD AUTO: 5.76 10*3/MM3 (ref 3.4–10.8)
WHOLE BLOOD HOLD COAG: NORMAL
WHOLE BLOOD HOLD SPECIMEN: NORMAL

## 2025-01-30 PROCEDURE — 83690 ASSAY OF LIPASE: CPT | Performed by: STUDENT IN AN ORGANIZED HEALTH CARE EDUCATION/TRAINING PROGRAM

## 2025-01-30 PROCEDURE — 74177 CT ABD & PELVIS W/CONTRAST: CPT

## 2025-01-30 PROCEDURE — 81003 URINALYSIS AUTO W/O SCOPE: CPT | Performed by: STUDENT IN AN ORGANIZED HEALTH CARE EDUCATION/TRAINING PROGRAM

## 2025-01-30 PROCEDURE — 96375 TX/PRO/DX INJ NEW DRUG ADDON: CPT

## 2025-01-30 PROCEDURE — 25010000002 ONDANSETRON PER 1 MG

## 2025-01-30 PROCEDURE — 99285 EMERGENCY DEPT VISIT HI MDM: CPT

## 2025-01-30 PROCEDURE — 25510000001 IOPAMIDOL 61 % SOLUTION: Performed by: STUDENT IN AN ORGANIZED HEALTH CARE EDUCATION/TRAINING PROGRAM

## 2025-01-30 PROCEDURE — 85025 COMPLETE CBC W/AUTO DIFF WBC: CPT | Performed by: STUDENT IN AN ORGANIZED HEALTH CARE EDUCATION/TRAINING PROGRAM

## 2025-01-30 PROCEDURE — 96374 THER/PROPH/DIAG INJ IV PUSH: CPT

## 2025-01-30 PROCEDURE — 83735 ASSAY OF MAGNESIUM: CPT

## 2025-01-30 PROCEDURE — 25810000003 SODIUM CHLORIDE 0.9 % SOLUTION

## 2025-01-30 PROCEDURE — 80053 COMPREHEN METABOLIC PANEL: CPT | Performed by: STUDENT IN AN ORGANIZED HEALTH CARE EDUCATION/TRAINING PROGRAM

## 2025-01-30 RX ORDER — ONDANSETRON 4 MG/1
4 TABLET, ORALLY DISINTEGRATING ORAL EVERY 6 HOURS PRN
Qty: 15 TABLET | Refills: 0 | Status: SHIPPED | OUTPATIENT
Start: 2025-01-30

## 2025-01-30 RX ORDER — PANTOPRAZOLE SODIUM 40 MG/10ML
80 INJECTION, POWDER, LYOPHILIZED, FOR SOLUTION INTRAVENOUS ONCE
Status: COMPLETED | OUTPATIENT
Start: 2025-01-30 | End: 2025-01-30

## 2025-01-30 RX ORDER — SUCRALFATE 1 G/1
1 TABLET ORAL
Qty: 30 TABLET | Refills: 0 | Status: SHIPPED | OUTPATIENT
Start: 2025-01-30

## 2025-01-30 RX ORDER — SODIUM CHLORIDE 9 MG/ML
10 INJECTION, SOLUTION INTRAMUSCULAR; INTRAVENOUS; SUBCUTANEOUS AS NEEDED
Status: DISCONTINUED | OUTPATIENT
Start: 2025-01-30 | End: 2025-01-30 | Stop reason: HOSPADM

## 2025-01-30 RX ORDER — IOPAMIDOL 612 MG/ML
100 INJECTION, SOLUTION INTRAVASCULAR
Status: COMPLETED | OUTPATIENT
Start: 2025-01-30 | End: 2025-01-30

## 2025-01-30 RX ORDER — PANTOPRAZOLE SODIUM 40 MG/1
40 TABLET, DELAYED RELEASE ORAL DAILY
Qty: 30 TABLET | Refills: 0 | Status: SHIPPED | OUTPATIENT
Start: 2025-01-30

## 2025-01-30 RX ORDER — ONDANSETRON 2 MG/ML
4 INJECTION INTRAMUSCULAR; INTRAVENOUS ONCE
Status: COMPLETED | OUTPATIENT
Start: 2025-01-30 | End: 2025-01-30

## 2025-01-30 RX ORDER — DICYCLOMINE HYDROCHLORIDE 10 MG/1
20 CAPSULE ORAL ONCE
Status: COMPLETED | OUTPATIENT
Start: 2025-01-30 | End: 2025-01-30

## 2025-01-30 RX ORDER — DICYCLOMINE HCL 20 MG
20 TABLET ORAL EVERY 8 HOURS PRN
Qty: 20 TABLET | Refills: 0 | Status: SHIPPED | OUTPATIENT
Start: 2025-01-30

## 2025-01-30 RX ORDER — SODIUM CHLORIDE 0.9 % (FLUSH) 0.9 %
10 SYRINGE (ML) INJECTION AS NEEDED
Status: DISCONTINUED | OUTPATIENT
Start: 2025-01-30 | End: 2025-01-30 | Stop reason: HOSPADM

## 2025-01-30 RX ADMIN — IOPAMIDOL 85 ML: 612 INJECTION, SOLUTION INTRAVENOUS at 17:34

## 2025-01-30 RX ADMIN — PANTOPRAZOLE SODIUM 80 MG: 40 INJECTION, POWDER, FOR SOLUTION INTRAVENOUS at 16:35

## 2025-01-30 RX ADMIN — SODIUM CHLORIDE 1000 ML: 9 INJECTION, SOLUTION INTRAVENOUS at 16:20

## 2025-01-30 RX ADMIN — DICYCLOMINE HYDROCHLORIDE 20 MG: 10 CAPSULE ORAL at 16:34

## 2025-01-30 RX ADMIN — ONDANSETRON 4 MG: 2 INJECTION INTRAMUSCULAR; INTRAVENOUS at 16:35

## 2025-01-30 NOTE — [facility] PROVIDER NOTE
Subjective  History of Present Illness:    Patient is a 34-year-old male who presents to the emergency department today with complaints of nausea, vomiting and diarrhea.  Patient states his daughter had the same illness last Monday.  Patient states his daughter illness resolved within 24 hours.  Patient states he had approximately 3 days of severe nausea, vomiting and diarrhea.  Patient states he was feeling better however over the past few days his nausea and vomiting has increased.  Patient denies any blood in his vomit or diarrhea.  Patient admits to epigastric pain at this time.  Patient describes the pain as sharp in quality and rates it 6 out of 10 on the pain scale at times but currently it is a 3 out of 10 on the pain scale.  Patient has tried taking Pepto-Bismol with minimal relief.  Patient states his abdominal discomfort is diffuse but specifically in his epigastric quadrant.  Patient denies any previous medical history with the exception of migraines and hemorrhoids.  Patient states he is due for hemorrhoid surgery.  Patient denies any tobacco, alcohol recreational drug use or abuse.      Nurses Notes reviewed and agree, including vitals, allergies, social history and prior medical history.     REVIEW OF SYSTEMS: All systems reviewed and not pertinent unless noted.  Review of Systems   Constitutional:  Positive for appetite change. Negative for activity change, chills, fatigue and fever.   HENT:  Negative for ear discharge, rhinorrhea and sore throat.    Respiratory:  Negative for cough, chest tightness, shortness of breath, wheezing and stridor.    Cardiovascular:  Negative for chest pain and palpitations.   Gastrointestinal:  Positive for abdominal pain, diarrhea, nausea and vomiting. Negative for blood in stool, constipation and rectal pain.   Genitourinary:  Negative for difficulty urinating, dysuria, enuresis and flank pain.   Neurological:  Negative for dizziness, weakness and light-headedness.  "  All other systems reviewed and are negative.      Past Medical History:   Diagnosis Date    Migraine     Nosebleed        Allergies:    Patient has no known allergies.      Past Surgical History:   Procedure Laterality Date    HEMORRHOIDECTOMY           Social History     Socioeconomic History    Marital status:    Tobacco Use    Smoking status: Former   Substance and Sexual Activity    Alcohol use: Yes     Alcohol/week: 2.0 standard drinks of alcohol     Types: 2 Shots of liquor per week     Comment: NIGHTLY    Drug use: No    Sexual activity: Defer         History reviewed. No pertinent family history.    Objective  Physical Exam:  /88   Pulse 94   Temp 98.9 °F (37.2 °C) (Oral)   Resp 16   Ht 177.8 cm (70\")   Wt 97.5 kg (215 lb)   SpO2 100%   BMI 30.85 kg/m²      Physical Exam  Vitals and nursing note reviewed.   Constitutional:       General: He is not in acute distress.     Appearance: He is well-developed and normal weight. He is not ill-appearing, toxic-appearing or diaphoretic.   HENT:      Head: Normocephalic and atraumatic.      Mouth/Throat:      Mouth: Mucous membranes are moist.      Pharynx: Oropharynx is clear. No pharyngeal swelling.   Eyes:      Extraocular Movements: Extraocular movements intact.      Pupils: Pupils are equal, round, and reactive to light.   Cardiovascular:      Rate and Rhythm: Normal rate and regular rhythm.      Heart sounds: Normal heart sounds. No murmur heard.     No friction rub. No gallop.   Pulmonary:      Effort: Pulmonary effort is normal.      Breath sounds: Normal breath sounds. No stridor. No wheezing, rhonchi or rales.   Abdominal:      General: Abdomen is flat. Bowel sounds are normal. Bowel sounds are increased. There is no distension.      Palpations: Abdomen is soft. There is no hepatomegaly or splenomegaly.      Tenderness: There is abdominal tenderness in the right upper quadrant, epigastric area and left upper quadrant. There is no right " CVA tenderness, left CVA tenderness or guarding.      Hernia: No hernia is present.   Skin:     General: Skin is warm and dry.      Capillary Refill: Capillary refill takes less than 2 seconds.   Neurological:      General: No focal deficit present.      Mental Status: He is alert and oriented to person, place, and time.   Psychiatric:         Mood and Affect: Mood normal. Mood is not anxious or depressed.         Behavior: Behavior normal.         Procedures    ED Course:         Lab Results (last 24 hours)       Procedure Component Value Units Date/Time    CBC & Differential [[id number]]  (Abnormal) Collected: 01/30/25 1617    Specimen: Blood Updated: 01/30/25 1637    Narrative:      The following orders were created for panel order CBC & Differential.  Procedure                               Abnormality         Status                     ---------                               -----------         ------                     CBC Auto Differential[[id number]]        Abnormal            Final result                 Please view results for these tests on the individual orders.    Comprehensive Metabolic Panel [[id number]]  (Abnormal) Collected: 01/30/25 1617    Specimen: Blood Updated: 01/30/25 1648     Glucose 85 mg/dL      BUN 14 mg/dL      Creatinine 0.94 mg/dL      Sodium 140 mmol/L      Potassium 3.9 mmol/L      Chloride 97 mmol/L      CO2 27.6 mmol/L      Calcium 8.9 mg/dL      Total Protein 7.5 g/dL      Albumin 4.2 g/dL      ALT (SGPT) 17 U/L      AST (SGOT) 21 U/L      Alkaline Phosphatase 54 U/L      Total Bilirubin 0.6 mg/dL      Globulin 3.3 gm/dL      A/G Ratio 1.3 g/dL      BUN/Creatinine Ratio 14.9     Anion Gap 15.4 mmol/L      eGFR 107.7 mL/min/1.73     Narrative:      GFR Categories in Chronic Kidney Disease (CKD)      GFR Category          GFR (mL/min/1.73)    Interpretation  G1                     90 or greater         Normal or high (1)  G2                      60-89                Mild decrease  (1)  G3a                   45-59                Mild to moderate decrease  G3b                   30-44                Moderate to severe decrease  G4                    15-29                Severe decrease  G5                    14 or less           Kidney failure          (1)In the absence of evidence of kidney disease, neither GFR category G1 or G2 fulfill the criteria for CKD.    eGFR calculation 2021 CKD-EPI creatinine equation, which does not include race as a factor    Lipase [[id number]]  (Normal) Collected: 01/30/25 1617    Specimen: Blood Updated: 01/30/25 1648     Lipase 29 U/L     Urinalysis With Microscopic If Indicated (No Culture) - Urine, Clean Catch [[id number]]  (Abnormal) Collected: 01/30/25 1617    Specimen: Urine, Clean Catch Updated: 01/30/25 1638     Color, UA Yellow     Appearance, UA Clear     pH, UA 6.0     Specific Gravity, UA 1.025     Glucose, UA Negative     Ketones, UA Trace     Bilirubin, UA Negative     Blood, UA Negative     Protein, UA Negative     Leuk Esterase, UA Negative     Nitrite, UA Negative     Urobilinogen, UA 0.2 E.U./dL    Narrative:      Urine microscopic not indicated.    CBC Auto Differential [[id number]]  (Abnormal) Collected: 01/30/25 1617    Specimen: Blood Updated: 01/30/25 1637     WBC 5.76 10*3/mm3      RBC 5.34 10*6/mm3      Hemoglobin 14.6 g/dL      Hematocrit 44.9 %      MCV 84.1 fL      MCH 27.3 pg      MCHC 32.5 g/dL      RDW 11.8 %      RDW-SD 36.6 fl      MPV 8.0 fL      Platelets 278 10*3/mm3      Neutrophil % 80.2 %      Lymphocyte % 11.3 %      Monocyte % 6.4 %      Eosinophil % 1.7 %      Basophil % 0.2 %      Immature Grans % 0.2 %      Neutrophils, Absolute 4.62 10*3/mm3      Lymphocytes, Absolute 0.65 10*3/mm3      Monocytes, Absolute 0.37 10*3/mm3      Eosinophils, Absolute 0.10 10*3/mm3      Basophils, Absolute 0.01 10*3/mm3      Immature Grans, Absolute 0.01 10*3/mm3     Magnesium [[id number]]  (Normal) Collected: 01/30/25 1617    Specimen: Blood  Updated: 01/30/25 1648     Magnesium 1.7 mg/dL              CT Abdomen Pelvis With Contrast    Result Date: 1/30/2025  CT ABDOMEN PELVIS W CONTRAST Date of Exam: 1/30/2025 5:19 PM EST Indication: Epigastric pain. Comparison: None available. Technique: Axial CT images were obtained of the abdomen and pelvis following the uneventful intravenous administration of iodinated contrast. Reconstructed coronal and sagittal images were also obtained. Automated exposure control and iterative construction methods were used. Findings: A 0.2 cm nodule is noted along the right minor fissure on image 2. 0.2 cm nodule in the right lower lobe on image 6 is noted. 0.2 cm nodule along the right minor fissure on image 9 is noted. Fluid is noted in the lumen of the distal thoracic esophagus presumably representing reflux. Mild coronary artery atherosclerotic calcification is noted. The liver, gallbladder, spleen, pancreas and adrenal glands appear unremarkable. Simple appearing cysts are noted in the kidneys bilaterally measuring 1.4 cm on the right and 2.4 cm on the left. Kidneys otherwise appear unremarkable. No ureteral stone is  seen. Subcentimeter mesenteric lymph nodes are nonpathologic by size criteria. No free fluid is seen in the abdomen or pelvis. The urinary bladder, prostate and seminal vesicles appear normal. No acute bowel abnormality is identified. The lack of oral contrast limits evaluation of the bowel. No focal osseous lesion is seen.     Impression: Impression: 1.Indeterminate nodules noted in the lung bases bilaterally. If the patient is felt to be at increased risk of neoplasm, follow-up chest CT in 12 months could be performed to further evaluate. 2.Evidence of gastroesophageal reflux. 3.Mild coronary artery atherosclerotic disease. 4.Simple bilateral renal cysts Electronically Signed: Mehdi Alba MD  1/30/2025 5:58 PM EST  Workstation ID: [id number]        MDM    Initial impression of presenting illness: Abdominal  pain, nausea vomiting diarrhea    DDX: includes but is not limited to: Gastritis versus gastroenteritis versus electrolyte abnormality versus ulcer versus cholecystitis    Patient arrives private vehicle with nonactionable vitals interpreted by myself.     Pertinent features from physical exam: Mild to moderate upper abdominal pain with palpation.    Initial diagnostic plan: CBC, CMP, lipase, UA, magnesium, CT abdomen pelvis    Results from initial plan were reviewed and interpreted by me revealing patient's laboratory results were nonactionable    Diagnostic information from other sources: N/A    Interventions / Re-evaluation: Patient had significant pain improvement after a liter of normal saline, IV Protonix, IV Zofran and p.o. Bentyl    Medications   Sodium Chloride (PF) 0.9 % 10 mL (has no administration in time range)   sodium chloride 0.9 % flush 10 mL (has no administration in time range)   sodium chloride 0.9 % bolus 1,000 mL (0 mL Intravenous Stopped 1/30/25 1745)   pantoprazole (PROTONIX) injection 80 mg (80 mg Intravenous Given 1/30/25 1635)   ondansetron (ZOFRAN) injection 4 mg (4 mg Intravenous Given 1/30/25 1635)   dicyclomine (BENTYL) capsule 20 mg (20 mg Oral Given 1/30/25 1634)   iopamidol (ISOVUE-300) 61 % injection 100 mL (85 mL Intravenous Given 1/30/25 1734)       Results/clinical rationale were discussed with patient.    Consultations/Discussion of results with other physicians: N/A    Data interpreted: Nursing notes reviewed, vital signs reviewed.  Labs independently interpreted by me (CBC, CMP, lipase, UA, magnesium).  Imaging independently interpreted by me (CT scan). O2 saturation: 100% on room air    Counseling: Discussed the results above with the patient regarding need for discharge.  Patient understands and agrees plan of care.      Patient is a 34-year-old male who presents to the emergency department today with complaints of epigastric pain, nausea vomiting or diarrhea.  Patient  states he had the symptoms for a week.  Patient states he originally had pain relief with Pepto-Bismol however his symptoms have returned and worsened.  Patient CBC and CMP are nonactionable.  Patient's lipase and magnesium are within normal limits.  Patient's urinalysis shows no evidence of urinary tract infection.  Patient CT scan revealed pulmonary nodules with a recommendation of a follow-up within 12 months if he has risk factors.  Patient was informed of this incidental finding and has little to no risk factors.  Patient's CT scan also showed some concerns for coronary artery disease in addition to GERD.  Patient had incidental findings of renal cyst.  Patient was informed of these findings.  Patient's pain had improved with a liter normal saline, IV Protonix, IV Zofran and p.o. Bentyl.  Patient's symptoms are consistent with gastritis.  Patient will be discharged home with a prescription for Protonix, Bentyl, Zofran and Carafate to use as needed.  Patient encouraged to use the Protonix daily.  Patient was informed of the appropriate diet for GERD.  Patient is given referral to gastroenterology.  Patient encouraged to follow-up with his primary care physician within the next 3 to 5 days.  Patient was informed of concerning symptoms that require immediate return to the emergency department.  Patient voiced understanding of the plan of care.    -----  ED Disposition       ED Disposition   Discharge    Condition   Stable    Comment   --             Final diagnoses:   Nausea vomiting and diarrhea   Gastroesophageal reflux disease, unspecified whether esophagitis present   Pulmonary nodules      Your Follow-Up Providers       Acosta Lehman MD In 1 week.    Specialty: Internal Medicine  Follow up details: If symptoms worsen  [address] [place] [address] [phone]               Brunner, Mark I, MD. Call in 1 week.    Specialty: Gastroenterology  Follow up details: If symptoms worsen, As needed  [address]  [facility] [address]  [place] [id number]  [phone]                       Contact information for after-discharge care    Follow-up information has not been specified.                    Your medication list        START taking these medications        Instructions Last Dose Given Next Dose Due   dicyclomine 20 MG tablet  Commonly known as: BENTYL      Take 1 tablet by mouth Every 8 (Eight) Hours As Needed for Abdominal Cramping.       ondansetron ODT 4 MG disintegrating tablet  Commonly known as: ZOFRAN-ODT      Take 1 tablet by mouth Every 6 (Six) Hours As Needed for Nausea or Vomiting.       pantoprazole 40 MG EC tablet  Commonly known as: PROTONIX      Take 1 tablet by mouth Daily.       sucralfate 1 g tablet  Commonly known as: CARAFATE      Take 1 tablet by mouth 3 (Three) Times a Day Before Meals.              CONTINUE taking these medications        Instructions Last Dose Given Next Dose Due   amitriptyline 50 MG tablet  Commonly known as: ELAVIL      Take 50 mg by mouth every night.       diclofenac 50 MG EC tablet  Commonly known as: VOLTAREN      Take 1 tablet by mouth 3 (Three) Times a Day.                 Where to Get Your Medications        These medications were sent to [facility]/pharmacy #[id number] - [place], [place] - [address] - [phone]  - [phone]   [address] [place] [address]      Hours: 24-hours Phone: [phone]   dicyclomine 20 MG tablet  ondansetron ODT 4 MG disintegrating tablet  pantoprazole 40 MG EC tablet  sucralfate 1 g tablet